# Patient Record
Sex: FEMALE | Race: WHITE | Employment: PART TIME | URBAN - METROPOLITAN AREA
[De-identification: names, ages, dates, MRNs, and addresses within clinical notes are randomized per-mention and may not be internally consistent; named-entity substitution may affect disease eponyms.]

---

## 2017-01-05 ENCOUNTER — HOSPITAL ENCOUNTER (OUTPATIENT)
Dept: MAMMOGRAPHY | Age: 22
Discharge: HOME | End: 2017-01-05
Attending: OBSTETRICS & GYNECOLOGY
Payer: COMMERCIAL

## 2017-01-05 DIAGNOSIS — N63.0 LUMP OR MASS IN BREAST: ICD-10-CM

## 2017-01-05 DIAGNOSIS — N64.59 INVERSION, NIPPLE: ICD-10-CM

## 2017-01-05 PROCEDURE — 76642 ULTRASOUND BREAST LIMITED: CPT

## 2017-01-05 NOTE — PROGRESS NOTES
I spoke with Georgi Black at Dr. Koki Cabrera office and relayed our recommendation of 2 right breast biopsies for masses seen on ultrasound. She will have order signed for patient. Ms. Kaitlynn Garcia has her films and reports. She is taking them back to [facility] (school) and will have biopsies done there.